# Patient Record
Sex: MALE
[De-identification: names, ages, dates, MRNs, and addresses within clinical notes are randomized per-mention and may not be internally consistent; named-entity substitution may affect disease eponyms.]

---

## 2023-09-26 ENCOUNTER — NURSE TRIAGE (OUTPATIENT)
Dept: OTHER | Facility: CLINIC | Age: 53
End: 2023-09-26

## 2023-09-26 NOTE — TELEPHONE ENCOUNTER
Location of patient: [place]    Clinic or Physician Name: Avtar Perkins    Subjective: Caller states he missed an appointment today and was calling to reschedule. Recommended disposition: [facility] advice provided, patient verbalizes understanding; denies any other questions or concerns. Outcome:  Perfect Serve message sent to provider. pt expecting call by 10am tomorrow 9//26      This triage is a result of a call to the [facility]  Reason for Disposition   Follow-up information-only call to recent contact, no triage required    Protocols used:  Information Only Call - No Triage-ADULT-[place]